# Patient Record
Sex: MALE | Race: WHITE | NOT HISPANIC OR LATINO | Employment: FULL TIME | URBAN - METROPOLITAN AREA
[De-identification: names, ages, dates, MRNs, and addresses within clinical notes are randomized per-mention and may not be internally consistent; named-entity substitution may affect disease eponyms.]

---

## 2021-03-23 ENCOUNTER — HOSPITAL ENCOUNTER (EMERGENCY)
Facility: HOSPITAL | Age: 46
Discharge: HOME | End: 2021-03-23
Attending: EMERGENCY MEDICINE | Admitting: EMERGENCY MEDICINE
Payer: COMMERCIAL

## 2021-03-23 VITALS
HEIGHT: 72 IN | HEART RATE: 113 BPM | WEIGHT: 275 LBS | DIASTOLIC BLOOD PRESSURE: 95 MMHG | OXYGEN SATURATION: 97 % | SYSTOLIC BLOOD PRESSURE: 185 MMHG | RESPIRATION RATE: 18 BRPM | BODY MASS INDEX: 37.25 KG/M2 | TEMPERATURE: 98.3 F

## 2021-03-23 DIAGNOSIS — T78.40XA ALLERGIC REACTION, INITIAL ENCOUNTER: Primary | ICD-10-CM

## 2021-03-23 DIAGNOSIS — L50.9 HIVES: ICD-10-CM

## 2021-03-23 PROCEDURE — 99282 EMERGENCY DEPT VISIT SF MDM: CPT

## 2021-03-23 PROCEDURE — 99284 EMERGENCY DEPT VISIT MOD MDM: CPT | Performed by: EMERGENCY MEDICINE

## 2021-03-23 RX ORDER — HYDROXYZINE PAMOATE 25 MG/1
25 CAPSULE ORAL 3 TIMES DAILY PRN
Qty: 30 CAPSULE | Refills: 0 | Status: SHIPPED | OUTPATIENT
Start: 2021-03-23

## 2021-03-23 RX ORDER — FAMOTIDINE 20 MG/1
40 TABLET, FILM COATED ORAL ONCE
Status: COMPLETED | OUTPATIENT
Start: 2021-03-23 | End: 2021-03-23

## 2021-03-23 RX ORDER — EPINEPHRINE 0.3 MG/.3ML
0.3 INJECTION SUBCUTANEOUS ONCE
Qty: 0.6 ML | Refills: 0 | Status: SHIPPED | OUTPATIENT
Start: 2021-03-23 | End: 2021-03-23

## 2021-03-23 RX ORDER — PREDNISONE 20 MG/1
20 TABLET ORAL 2 TIMES DAILY WITH MEALS
Qty: 10 TABLET | Refills: 0 | Status: SHIPPED | OUTPATIENT
Start: 2021-03-23 | End: 2021-03-28

## 2021-03-23 RX ADMIN — FAMOTIDINE 40 MG: 20 TABLET, FILM COATED ORAL at 18:37

## 2021-03-23 RX ADMIN — PREDNISONE 50 MG: 10 TABLET ORAL at 18:37

## 2021-03-23 NOTE — ED PROVIDER NOTES
History  Chief Complaint   Patient presents with    Rash     pt reports generalized rash started this morning, took benadryl around 1pm with no relief  This is a 51-year-old male who presents to the ED for evaluation of itchy rash to his body that started this morning  He denies any new foods or new products at home  He works in air conditioning repair  He states that he took mg of Benadryl at 1:30 p m  which did help somewhat although the itching has returned  States the itching is mostly to his arms, legs and torso  He does have a history of allergic reaction to plaintains, but has not eaten any of those today, states he had a breakfast sandwich this morning for breakfast   He denies any difficulty breathing, no swelling to his tongue, lips, throat  Rash  Location:  Torso, leg and shoulder/arm  Severity:  Moderate  Onset quality:  Sudden  Duration:  8 hours  Timing:  Constant  Progression:  Waxing and waning  Chronicity:  New  Relieved by: Antihistamines  Worsened by:  Nothing  Associated symptoms: no shortness of breath and not wheezing        None       History reviewed  No pertinent past medical history  History reviewed  No pertinent surgical history  History reviewed  No pertinent family history  I have reviewed and agree with the history as documented  E-Cigarette/Vaping     E-Cigarette/Vaping Substances     Social History     Tobacco Use    Smoking status: Never Smoker    Smokeless tobacco: Never Used   Substance Use Topics    Alcohol use: Never     Frequency: Never    Drug use: Not on file       Review of Systems   Respiratory: Negative for shortness of breath and wheezing  Skin: Positive for rash  All other systems reviewed and are negative  Physical Exam  Physical Exam  Vitals signs and nursing note reviewed  Constitutional:       General: He is not in acute distress  Appearance: Normal appearance  He is obese  HENT:      Head: Normocephalic and atraumatic  Right Ear: External ear normal       Left Ear: External ear normal       Nose: Nose normal  No congestion or rhinorrhea  Mouth/Throat:      Mouth: Mucous membranes are moist       Pharynx: Oropharynx is clear  No oropharyngeal exudate or posterior oropharyngeal erythema  Eyes:      General: No scleral icterus  Right eye: No discharge  Left eye: No discharge  Conjunctiva/sclera: Conjunctivae normal    Neck:      Musculoskeletal: Normal range of motion and neck supple  Cardiovascular:      Rate and Rhythm: Regular rhythm  Tachycardia present  Pulses: Normal pulses  Heart sounds: Normal heart sounds  Pulmonary:      Effort: Pulmonary effort is normal  No respiratory distress  Breath sounds: Normal breath sounds  Abdominal:      General: Abdomen is flat  Palpations: Abdomen is soft  Tenderness: There is no abdominal tenderness  Musculoskeletal: Normal range of motion  General: No swelling  Skin:     General: Skin is warm and dry  Capillary Refill: Capillary refill takes less than 2 seconds  Findings: Rash present  Comments: Scattered hives of various sizes across torso - chest, back  Resolving hives w/ faint erythematous changes to bilateral arms  Neurological:      General: No focal deficit present  Mental Status: He is alert and oriented to person, place, and time  Mental status is at baseline     Psychiatric:         Mood and Affect: Mood normal          Behavior: Behavior normal          Vital Signs  ED Triage Vitals [03/23/21 1735]   Temperature Pulse Respirations Blood Pressure SpO2   98 3 °F (36 8 °C) (!) 113 18 (!) 185/95 97 %      Temp Source Heart Rate Source Patient Position - Orthostatic VS BP Location FiO2 (%)   Oral Monitor Sitting Right arm --      Pain Score       --           Vitals:    03/23/21 1735   BP: (!) 185/95   Pulse: (!) 113   Patient Position - Orthostatic VS: Sitting         Visual Acuity      ED Medications  Medications   predniSONE tablet 50 mg (50 mg Oral Given 3/23/21 1837)   famotidine (PEPCID) tablet 40 mg (40 mg Oral Given 3/23/21 1837)       Diagnostic Studies  Results Reviewed     None                 No orders to display              Procedures  Procedures         ED Course                             SBIRT 22yo+      Most Recent Value   SBIRT (22 yo +)   In order to provide better care to our patients, we are screening all of our patients for alcohol and drug use  Would it be okay to ask you these screening questions? Yes Filed at: 03/23/2021 1839   Initial Alcohol Screen: US AUDIT-C    1  How often do you have a drink containing alcohol?  0 Filed at: 03/23/2021 1839   3a  Male UNDER 65: How often do you have five or more drinks on one occasion? 0 Filed at: 03/23/2021 1839   Audit-C Score  0 Filed at: 03/23/2021 1839   IMAN: How many times in the past year have you    Used an illegal drug or used a prescription medication for non-medical reasons? Never Filed at: 03/23/2021 1839                    MDM  Number of Diagnoses or Management Options  Allergic reaction, initial encounter: new and requires workup  Hives: new and requires workup  Diagnosis management comments: 56-year-old male with generalized urticaria, no clear etiology of the allergic reaction  It has responded to Benadryl at home  He did drive himself here so I recommend he take Benadryl when he returns home, will give dose of prednisone and Pepcid here no other signs of severe allergic reaction/anaphylaxis  Advised to follow up with primary doctor, will treat with prednisone, antihistamines at home         Amount and/or Complexity of Data Reviewed  Decide to obtain previous medical records or to obtain history from someone other than the patient: yes  Independent visualization of images, tracings, or specimens: yes    Risk of Complications, Morbidity, and/or Mortality  Presenting problems: low  Diagnostic procedures: minimal  Management options: low    Patient Progress  Patient progress: stable      Disposition  Final diagnoses: Allergic reaction, initial encounter   Hives     Time reflects when diagnosis was documented in both MDM as applicable and the Disposition within this note     Time User Action Codes Description Comment    3/23/2021  6:08 PM Dick, [address] Allergic reaction, initial encounter     3/23/2021  6:08 PM Dick, [address]       ED Disposition     ED Disposition Condition Date/Time Comment    Discharge Stable Tue Mar 23, 2021  6:08 PM Terrencejose eduardo Bessy discharge to home/self care  Follow-up Information     Follow up With Specialties Details Why Contact Info Additional Information    [facility] [address] In 3 days or your own primary doctor [address]  [address] [facility] [address] [phone] PH WZ[facility] [facility], [address] [facility], [address], [place], [place], [address], [phone]          Discharge Medication List as of 3/23/2021  6:11 PM      START taking these medications    Details   hydrOXYzine pamoate (VISTARIL) 25 mg capsule Take 1 capsule (25 mg total) by mouth 3 (three) times a day as needed for itching, Starting Tue 3/23/2021, Normal      predniSONE 20 mg tablet Take 1 tablet (20 mg total) by mouth 2 (two) times a day with meals for 5 days, Starting Tue 3/23/2021, Until Sun 3/28/2021, Normal           No discharge procedures on file      PDMP Review     None          ED Provider  Electronically Signed by           Maci Mccracken DO  03/23/21 5863

## 2021-03-25 DIAGNOSIS — Z23 ENCOUNTER FOR IMMUNIZATION: ICD-10-CM

## 2023-08-03 ENCOUNTER — HOSPITAL ENCOUNTER (EMERGENCY)
Facility: HOSPITAL | Age: 48
Discharge: HOME | End: 2023-08-03
Attending: INTERNAL MEDICINE
Payer: OTHER MISCELLANEOUS

## 2023-08-03 VITALS
RESPIRATION RATE: 20 BRPM | HEART RATE: 109 BPM | TEMPERATURE: 99 F | BODY MASS INDEX: 36.51 KG/M2 | HEIGHT: 70 IN | SYSTOLIC BLOOD PRESSURE: 194 MMHG | WEIGHT: 255 LBS | DIASTOLIC BLOOD PRESSURE: 122 MMHG | OXYGEN SATURATION: 99 %

## 2023-08-03 DIAGNOSIS — S61.209A AVULSION OF FINGER, INITIAL ENCOUNTER: ICD-10-CM

## 2023-08-03 DIAGNOSIS — S61.012A LACERATION OF LEFT THUMB WITHOUT FOREIGN BODY WITHOUT DAMAGE TO NAIL, INITIAL ENCOUNTER: Primary | ICD-10-CM

## 2023-08-03 PROCEDURE — 99282 EMERGENCY DEPT VISIT SF MDM: CPT

## 2023-08-03 PROCEDURE — 99284 EMERGENCY DEPT VISIT MOD MDM: CPT | Performed by: PHYSICIAN ASSISTANT

## 2023-08-03 PROCEDURE — 12001 RPR S/N/AX/GEN/TRNK 2.5CM/<: CPT | Performed by: PHYSICIAN ASSISTANT

## 2023-08-03 PROCEDURE — 90715 TDAP VACCINE 7 YRS/> IM: CPT | Performed by: PHYSICIAN ASSISTANT

## 2023-08-03 PROCEDURE — 90471 IMMUNIZATION ADMIN: CPT

## 2023-08-03 RX ORDER — LIDOCAINE HYDROCHLORIDE AND EPINEPHRINE 10; 10 MG/ML; UG/ML
5 INJECTION, SOLUTION INFILTRATION; PERINEURAL ONCE
Status: COMPLETED | OUTPATIENT
Start: 2023-08-03 | End: 2023-08-03

## 2023-08-03 RX ADMIN — LIDOCAINE HYDROCHLORIDE,EPINEPHRINE BITARTRATE 5 ML: 10; .01 INJECTION, SOLUTION INFILTRATION; PERINEURAL at 10:17

## 2023-08-03 RX ADMIN — TETANUS TOXOID, REDUCED DIPHTHERIA TOXOID AND ACELLULAR PERTUSSIS VACCINE, ADSORBED 0.5 ML: 5; 2.5; 8; 8; 2.5 SUSPENSION INTRAMUSCULAR at 10:18

## 2023-08-03 NOTE — DISCHARGE INSTRUCTIONS
Use Tylenol 650 mg every 4 hours or Motrin 600 mg every 6 hours; you can alternate the 2 medications taking something every 3 hours for pain as needed. Suture removal to left thumb in 7-10 days. Remove dressing to index finger in 48 hours, then wash gently with soap and water around gelfoam pad; trying to allow this to stay on until it falls off on it's own; pat drying keep covered with antibiotic ointment and dressing.

## 2023-08-03 NOTE — ED PROVIDER NOTES
History  Chief Complaint   Patient presents with   • Finger Laceration     Cut L thumb and L index finger with , unsure last tetanus     No PMH  No PSH  Patient presents to ED for further evaluation of left thumb laceration  and left index finger avulsion that patient sustained immediately prior to arrival while at work, was using a ; and accidentally cut fingers. No other injuries or complaints, full range of motion maintained, + active bleeding  Unknown tetanus          Prior to Admission Medications   Prescriptions Last Dose Informant Patient Reported? Taking? EPINEPHrine (EPIPEN) 0.3 mg/0.3 mL SOAJ   No No   Sig: Inject 0.3 mL (0.3 mg total) into a muscle once for 1 dose   hydrOXYzine pamoate (VISTARIL) 25 mg capsule   No No   Sig: Take 1 capsule (25 mg total) by mouth 3 (three) times a day as needed for itching      Facility-Administered Medications: None       History reviewed. No pertinent past medical history. History reviewed. No pertinent surgical history. History reviewed. No pertinent family history. I have reviewed and agree with the history as documented. E-Cigarette/Vaping   • E-Cigarette Use Never User      E-Cigarette/Vaping Substances     Social History     Tobacco Use   • Smoking status: Never     Passive exposure: Never   • Smokeless tobacco: Never   Vaping Use   • Vaping Use: Never used   Substance Use Topics   • Alcohol use: Yes     Comment: social   • Drug use: Not Currently       Review of Systems   Constitutional: Negative for fever. Respiratory: Negative for shortness of breath. Musculoskeletal: Positive for myalgias. Skin: Positive for wound. Neurological: Negative for numbness. All other systems reviewed and are negative. Physical Exam  Physical Exam  Vitals and nursing note reviewed. Constitutional:       General: He is in acute distress (mild). Appearance: He is well-developed. HENT:      Head: Normocephalic and atraumatic. Right Ear: External ear normal.      Left Ear: External ear normal.      Nose: Nose normal.      Mouth/Throat:      Mouth: Mucous membranes are moist.      Pharynx: Oropharynx is clear. Cardiovascular:      Rate and Rhythm: Normal rate. Pulmonary:      Effort: Pulmonary effort is normal. No respiratory distress. Musculoskeletal:         General: Tenderness and signs of injury present. No deformity. Normal range of motion. Cervical back: Normal range of motion. Comments: +1.5 cm subcutaneous, linear laceration noted to mid aspect of left thumb. +1.5 cm skin avulsion noted to mid to distal lateral aspect of left index finger, scant venous bleeding, no foreign body,  Distal neurovascular intact, full range of motion maintained   Skin:     General: Skin is warm and dry. Capillary Refill: Capillary refill takes less than 2 seconds. Findings: Lesion present. Neurological:      General: No focal deficit present. Mental Status: He is alert. Motor: No weakness. Psychiatric:         Behavior: Behavior normal.         Vital Signs  ED Triage Vitals [08/03/23 1013]   Temperature Pulse Respirations Blood Pressure SpO2   99 °F (37.2 °C) (!) 109 20 (!) 194/122 99 %      Temp Source Heart Rate Source Patient Position - Orthostatic VS BP Location FiO2 (%)   Oral Monitor Sitting Right arm --      Pain Score       2           Vitals:    08/03/23 1013   BP: (!) 194/122   Pulse: (!) 109   Patient Position - Orthostatic VS: Sitting         Visual Acuity      ED Medications  Medications   lidocaine-epinephrine (XYLOCAINE/EPINEPHRINE) 1 %-1:100,000 injection 5 mL (5 mL Infiltration Given 8/3/23 1017)   tetanus-diphtheria-acellular pertussis (BOOSTRIX) IM injection 0.5 mL (0.5 mL Intramuscular Given 8/3/23 1018)       Diagnostic Studies  Results Reviewed     None                 No orders to display              Procedures  Universal Protocol:  Consent: Verbal consent obtained.   Risks and benefits: risks, benefits and alternatives were discussed  Consent given by: patient  Time out: Immediately prior to procedure a "time out" was called to verify the correct patient, procedure, equipment, support staff and site/side marked as required. Patient understanding: patient states understanding of the procedure being performed  Patient identity confirmed: verbally with patient    Laceration repair    Date/Time: 8/3/2023 10:40 AM    Performed by: Daya Acharya PA-C  Authorized by: Daya Acharya PA-C  Location: 1.5cm laceration to left thumb; 1.5cm skin avulsion to index finger. Laceration length: 1.5 cm  Foreign bodies: no foreign bodies  Tendon involvement: none  Nerve involvement: none  Vascular damage: no  Anesthesia: local infiltration (thumb)    Anesthesia:  Local Anesthetic: lidocaine 1% with epinephrine  Anesthetic total: 2 mL    Sedation:  Patient sedated: no      Wound Dehiscence:  Superficial Wound Dehiscence: simple closure      Procedure Details:  Preparation: Patient was prepped and draped in the usual sterile fashion. Irrigation solution: saline  Irrigation method: syringe  Skin closure: 5-0 nylon  Number of sutures: 4  Technique: simple  Approximation: close  Approximation difficulty: simple  Dressing: bandaid. Patient tolerance: patient tolerated the procedure well with no immediate complications  Comments: Left thumb sutured as above  Left index finger with skin avulsion, pt does not have piece, so unable to suture. Wound cleaned with saline, gelfoam placed with good hemostasis, xeroform and bulky gauze dressing placed               ED Course                                             Medical Decision Making  Patient with left thumb laceration that was sutured, left index finger avulsion that was dressed with Surgifoam, patient improved and stable for discharge    Risk  Prescription drug management.           Disposition  Final diagnoses:   Laceration of left thumb without foreign body without damage to nail, initial encounter   Avulsion of finger, initial encounter     Time reflects when diagnosis was documented in both MDM as applicable and the Disposition within this note     Time User Action Codes Description Comment    8/3/2023 10:38 AM Roland Kelsey Add [S61.012A] Laceration of left thumb without foreign body without damage to nail, initial encounter     8/3/2023 10:38 AM Roland Mejiamarija Add [S61.209A] Avulsion of finger, initial encounter       ED Disposition     ED Disposition   Discharge    Condition   Stable    Date/Time   Thu Aug 3, 2023 10:38 AM    Comment   Alistair Willis discharge to home/self care. Follow-up Information     Follow up With Specialties Details Why Contact Info    Your PCP, ED, workman's compensation provider or [facility]              Discharge Medication List as of 8/3/2023 10:40 AM      CONTINUE these medications which have NOT CHANGED    Details   EPINEPHrine (EPIPEN) 0.3 mg/0.3 mL SOAJ Inject 0.3 mL (0.3 mg total) into a muscle once for 1 dose, Starting Tue 3/23/2021, Normal      hydrOXYzine pamoate (VISTARIL) 25 mg capsule Take 1 capsule (25 mg total) by mouth 3 (three) times a day as needed for itching, Starting Tue 3/23/2021, Normal             No discharge procedures on file.     PDMP Review     None          ED Provider  Electronically Signed by           Griffin Uriostegui PA-C  08/03/23 8329

## 2023-08-16 ENCOUNTER — TELEPHONE (OUTPATIENT)
Age: 48
End: 2023-08-16

## 2023-08-16 NOTE — TELEPHONE ENCOUNTER
Patient is being referred to a orthopedics. Please schedule accordingly.     [address]   [phone]

## 2024-04-08 ENCOUNTER — HOSPITAL ENCOUNTER (EMERGENCY)
Facility: HOSPITAL | Age: 49
Discharge: HOME | End: 2024-04-08
Attending: EMERGENCY MEDICINE | Admitting: EMERGENCY MEDICINE
Payer: OTHER MISCELLANEOUS

## 2024-04-08 ENCOUNTER — APPOINTMENT (EMERGENCY)
Dept: RADIOLOGY | Facility: HOSPITAL | Age: 49
End: 2024-04-08
Payer: OTHER MISCELLANEOUS

## 2024-04-08 VITALS
HEIGHT: 71 IN | DIASTOLIC BLOOD PRESSURE: 80 MMHG | BODY MASS INDEX: 38.27 KG/M2 | WEIGHT: 273.37 LBS | TEMPERATURE: 98.4 F | OXYGEN SATURATION: 96 % | HEART RATE: 88 BPM | RESPIRATION RATE: 18 BRPM | SYSTOLIC BLOOD PRESSURE: 127 MMHG

## 2024-04-08 DIAGNOSIS — M79.672 LEFT FOOT PAIN: Primary | ICD-10-CM

## 2024-04-08 PROCEDURE — 73630 X-RAY EXAM OF FOOT: CPT

## 2024-04-08 RX ORDER — METOPROLOL SUCCINATE 50 MG/1
TABLET, EXTENDED RELEASE ORAL
COMMUNITY

## 2024-04-08 NOTE — DISCHARGE INSTRUCTIONS
Follow-up with your primary care physician.  He can take Tylenol and Motrin every 6 hours as needed for pain at home.  You can apply ice to the area as well.  Please return to the emergency department if you develop worsening symptoms, severe pain, or anything else concerning to you.

## 2024-04-08 NOTE — ED PROVIDER NOTES
History  Chief Complaint   Patient presents with    Ankle Injury     Injured L ankle/lateral foot stepping of a platform 30 minutes, able to bear weight, but painful     49-year-old male with history of hypertension who presents for evaluation of left foot pain.  Patient reports that approximately 30 minutes prior to arrival he stepped off of a platform onto his left foot which inverted.  He put all of his weight on the lateral aspect of the left foot.  He thinks he heard a pop and then had immediate pain area.  He has been able to bear weight, however, it is painful.  He had immediate swelling to the area as well.  He did not take any medications for pain prior to arrival.  He denies any other injury.        Prior to Admission Medications   Prescriptions Last Dose Informant Patient Reported? Taking?   EPINEPHrine (EPIPEN) 0.3 mg/0.3 mL SOAJ   No No   Sig: Inject 0.3 mL (0.3 mg total) into a muscle once for 1 dose   hydrOXYzine pamoate (VISTARIL) 25 mg capsule   No No   Sig: Take 1 capsule (25 mg total) by mouth 3 (three) times a day as needed for itching   metoprolol succinate (TOPROL-XL) 50 mg 24 hr tablet   Yes No      Facility-Administered Medications: None       Past Medical History:   Diagnosis Date    Hypertension        History reviewed. No pertinent surgical history.    History reviewed. No pertinent family history.  I have reviewed and agree with the history as documented.    E-Cigarette/Vaping    E-Cigarette Use Never User      E-Cigarette/Vaping Substances     Social History     Tobacco Use    Smoking status: Never     Passive exposure: Never    Smokeless tobacco: Never   Vaping Use    Vaping status: Never Used   Substance Use Topics    Alcohol use: Yes     Comment: social    Drug use: Not Currently       Review of Systems   Constitutional:  Negative for chills and fever.   Respiratory:  Negative for shortness of breath.    Cardiovascular:  Negative for chest pain.   Gastrointestinal:  Negative for  abdominal pain.   Genitourinary:  Negative for flank pain.   Musculoskeletal:  Positive for arthralgias. Negative for gait problem.   Skin:  Negative for rash.   Neurological:  Negative for weakness and numbness.   All other systems reviewed and are negative.      Physical Exam  Physical Exam  Vitals and nursing note reviewed.   Constitutional:       General: He is not in acute distress.     Appearance: He is not ill-appearing.   HENT:      Head: Normocephalic and atraumatic.      Nose: Nose normal.      Mouth/Throat:      Mouth: Mucous membranes are moist.   Eyes:      Conjunctiva/sclera: Conjunctivae normal.   Cardiovascular:      Rate and Rhythm: Normal rate.      Comments: 2+ left DP pulse.  Pulmonary:      Effort: Pulmonary effort is normal.   Abdominal:      General: There is no distension.   Musculoskeletal:         General: Swelling and tenderness present. Normal range of motion.      Cervical back: Normal range of motion and neck supple.      Comments: Tenderness at the base of the fifth metatarsal of the left foot.  There is significant swelling to this area as well.  No tenderness throughout the remainder of the foot, left ankle, left tib-fib, or left knee.  Range of motion is intact to the left lower extremity.   Skin:     General: Skin is warm and dry.      Findings: No rash.   Neurological:      General: No focal deficit present.      Mental Status: He is alert and oriented to person, place, and time.      Comments: Motor and sensation intact to the left lower extremity.   Psychiatric:         Behavior: Behavior normal.         Vital Signs  ED Triage Vitals   Temperature Pulse Respirations Blood Pressure SpO2   04/08/24 1134 04/08/24 1133 04/08/24 1133 04/08/24 1133 04/08/24 1133   98.4 °F (36.9 °C) 88 18 127/80 96 %      Temp Source Heart Rate Source Patient Position - Orthostatic VS BP Location FiO2 (%)   04/08/24 1133 04/08/24 1133 04/08/24 1133 04/08/24 1133 --   Oral Monitor Sitting Left arm        Pain Score       04/08/24 1133       5           Vitals:    04/08/24 1133   BP: 127/80   Pulse: 88   Patient Position - Orthostatic VS: Sitting         Visual Acuity      ED Medications  Medications - No data to display    Diagnostic Studies  Results Reviewed       None                   XR foot 3+ views LEFT   ED Interpretation by Lillian Flores MD (04/08 1209)   No acute fracture or dislocation. Independently interpreted by me.                 Procedures  Procedures         ED Course                                             Medical Decision Making  49-year-old male presenting for evaluation of left foot pain after injury.  The extremity is neurovascularly intact.  Differential diagnoses include but not limited to fracture, dislocation, sprain/strain, contusion.  X-ray without any acute fracture or dislocation noted.  Symptomatic treatment discussed with patient.  Advised follow-up with Ortho if needed.  Return precautions discussed.    Problems Addressed:  Left foot pain: acute illness or injury    Amount and/or Complexity of Data Reviewed  Radiology: ordered and independent interpretation performed.             Disposition  Final diagnoses:   Left foot pain     Time reflects when diagnosis was documented in both MDM as applicable and the Disposition within this note       Time User Action Codes Description Comment    4/8/2024 12:49 PM Lillian Flores Add [M79.672] Left foot pain           ED Disposition       ED Disposition   Discharge    Condition   Stable    Date/Time   Mon Apr 8, 2024 12:49 PM    Comment   Colby Franco discharge to home/self care.                   Follow-up Information    None         Discharge Medication List as of 4/8/2024 12:50 PM        CONTINUE these medications which have NOT CHANGED    Details   EPINEPHrine (EPIPEN) 0.3 mg/0.3 mL SOAJ Inject 0.3 mL (0.3 mg total) into a muscle once for 1 dose, Starting Tue 3/23/2021, Normal      hydrOXYzine pamoate (VISTARIL) 25 mg capsule Take 1  capsule (25 mg total) by mouth 3 (three) times a day as needed for itching, Starting Tue 3/23/2021, Normal      metoprolol succinate (TOPROL-XL) 50 mg 24 hr tablet Historical Med             No discharge procedures on file.    PDMP Review       None            ED Provider  Electronically Signed by             Lillian Flores MD  04/08/24 3865